# Patient Record
Sex: MALE | Employment: FULL TIME | URBAN - METROPOLITAN AREA
[De-identification: names, ages, dates, MRNs, and addresses within clinical notes are randomized per-mention and may not be internally consistent; named-entity substitution may affect disease eponyms.]

---

## 2021-05-19 ENCOUNTER — HOSPITAL ENCOUNTER (EMERGENCY)
Facility: CLINIC | Age: 26
Discharge: HOME | End: 2021-05-19
Attending: EMERGENCY MEDICINE | Admitting: EMERGENCY MEDICINE
Payer: COMMERCIAL

## 2021-05-19 VITALS
SYSTOLIC BLOOD PRESSURE: 120 MMHG | HEART RATE: 72 BPM | OXYGEN SATURATION: 98 % | RESPIRATION RATE: 18 BRPM | TEMPERATURE: 97.4 F | DIASTOLIC BLOOD PRESSURE: 66 MMHG

## 2021-05-19 DIAGNOSIS — K08.89 PAIN, DENTAL: ICD-10-CM

## 2021-05-19 PROCEDURE — 250N000013 HC RX MED GY IP 250 OP 250 PS 637: Performed by: EMERGENCY MEDICINE

## 2021-05-19 PROCEDURE — 99283 EMERGENCY DEPT VISIT LOW MDM: CPT

## 2021-05-19 RX ADMIN — AMOXICILLIN AND CLAVULANATE POTASSIUM 1 TABLET: 875; 125 TABLET, FILM COATED ORAL at 03:50

## 2021-05-19 ASSESSMENT — ENCOUNTER SYMPTOMS
COLOR CHANGE: 0
NECK PAIN: 1
TROUBLE SWALLOWING: 0
FACIAL SWELLING: 0
FEVER: 0

## 2021-05-19 NOTE — ED PROVIDER NOTES
History   Chief Complaint:  Neck Pain and Dental Pain       HPI   Tin Bhardwaj is a 25 year old male who presents with dental pain.  The patient reports a sore throat about 2 weeks ago for which [place] [facility] [place], strep, and mono tests that were all negative.  His sore throat improved but he started having more tooth pain on the left and realized that his symptoms may have actually been more related to a dental issue.  The pain radiates into the left side of his neck.  He does not have any facial swelling, redness, or fever.  He has been taking Motrin for pain.  He has not yet seen a dentist.    Review of Systems   Constitutional: Negative for fever.   HENT: Positive for dental problem. Negative for facial swelling and trouble swallowing.    Musculoskeletal: Positive for neck pain.   Skin: Negative for color change.   All other systems reviewed and are negative.    Allergies:  No known drug allergies.     Medications:  Motrin    Past Medical History:    No significant past medical history.      Social History:  Presents to the ED with his sister.    Physical Exam     Patient Vitals for the past 24 hrs:   BP Temp Temp src Pulse Resp SpO2   05/19/21 0037 119/56 97.4  F (36.3  C) Oral 68 18 100 %       Physical Exam  General: Sitting up in bed  Eyes:  The pupils are equal and round    Conjunctivae and sclerae are normal  ENT:    Wearing a mask, oropharynx clear, no neck swelling or erythema, no swelling intra-orally, tender on tooth #17.   Neck:  Normal range of motion  CV:  Regular rate     Skin warm and well perfused   Resp:  Non labored breathing on room air    No tachypnea    No cough heard  MS:  Normal muscular tone  Skin:  No rash or acute skin lesions noted  Neuro:   Awake, alert.      Speech is normal and fluent.    Face is symmetric.     Moves all extremities equally  Psych: Normal affect.  Appropriate interactions.    Emergency Department Course     Emergency Department  Course:  Reviewed:  I reviewed nursing notes, vitals and past medical history    Assessments:  (8391) I obtained history and examined the patient as noted above.     Disposition:  The patient was discharged to home.     Impression & Plan     Medical Decision Making:  Tin Bhardwaj is a 25 year old male who presents for evaluation of dental pain.  This appears to be secondary to a dental issue. There is no abscess detected around the tooth amenable to incision and drainage. The differential diagnosis includes: cracked tooth syndrome, pulpitis, sub-apical abscess, facial cellulitis, alveolitis amongst others. There is no evidence of deep space infections, significant facial swelling, Lemierre's Syndrome or Steve's angina. There are no posterior pharyngeal space (RPA, PTA) infections detected. There is no swelling on neck. Follow up with a dentist and list of dental clinics given to patient.     Diagnosis:    ICD-10-CM    1. Pain, dental  K08.89        Discharge Medications:  New Prescriptions    AMOXICILLIN-CLAVULANATE (AUGMENTIN) 875-125 MG TABLET    Take 1 tablet by mouth 2 times daily for 7 days       Scribe Disclosure:  I, Lupe Marroquincam, am serving as a scribe at 3:27 AM on 5/19/2021 to document services personally performed by Radha Lake MD based on my observations and the provider's statements to me.         Radha Lake MD  05/19/21 [id number]